# Patient Record
Sex: FEMALE | Race: WHITE
[De-identification: names, ages, dates, MRNs, and addresses within clinical notes are randomized per-mention and may not be internally consistent; named-entity substitution may affect disease eponyms.]

---

## 2019-10-26 ENCOUNTER — HOSPITAL ENCOUNTER (EMERGENCY)
Dept: HOSPITAL 75 - ER FS | Age: 39
Discharge: HOME | End: 2019-10-26
Payer: MEDICAID

## 2019-10-26 VITALS — HEIGHT: 66.97 IN | BODY MASS INDEX: 26.3 KG/M2 | WEIGHT: 167.55 LBS

## 2019-10-26 VITALS — SYSTOLIC BLOOD PRESSURE: 147 MMHG | DIASTOLIC BLOOD PRESSURE: 87 MMHG

## 2019-10-26 DIAGNOSIS — Z91.041: ICD-10-CM

## 2019-10-26 DIAGNOSIS — Z88.6: ICD-10-CM

## 2019-10-26 DIAGNOSIS — I10: ICD-10-CM

## 2019-10-26 DIAGNOSIS — Z88.1: ICD-10-CM

## 2019-10-26 DIAGNOSIS — Z90.710: ICD-10-CM

## 2019-10-26 DIAGNOSIS — Z88.0: ICD-10-CM

## 2019-10-26 DIAGNOSIS — F17.210: ICD-10-CM

## 2019-10-26 DIAGNOSIS — R10.9: Primary | ICD-10-CM

## 2019-10-26 LAB
ALBUMIN SERPL-MCNC: 4.3 GM/DL (ref 3.2–4.5)
ALP SERPL-CCNC: 75 U/L (ref 40–136)
ALT SERPL-CCNC: 11 U/L (ref 0–55)
AMORPH SED URNS QL MICRO: (no result) /LPF
APTT PPP: YELLOW S
BACTERIA #/AREA URNS HPF: NEGATIVE /HPF
BASOPHILS # BLD AUTO: 0.1 10^3/UL (ref 0–0.1)
BASOPHILS NFR BLD AUTO: 1 % (ref 0–10)
BILIRUB SERPL-MCNC: 0.4 MG/DL (ref 0.1–1)
BILIRUB UR QL STRIP: NEGATIVE
BUN/CREAT SERPL: 14
CALCIUM SERPL-MCNC: 9.4 MG/DL (ref 8.5–10.1)
CHLORIDE SERPL-SCNC: 103 MMOL/L (ref 98–107)
CO2 SERPL-SCNC: 27 MMOL/L (ref 21–32)
CREAT SERPL-MCNC: 0.97 MG/DL (ref 0.6–1.3)
EOSINOPHIL # BLD AUTO: 0.2 10^3/UL (ref 0–0.3)
EOSINOPHIL NFR BLD AUTO: 2 % (ref 0–10)
ERYTHROCYTE [DISTWIDTH] IN BLOOD BY AUTOMATED COUNT: 13.8 % (ref 10–14.5)
FIBRINOGEN PPP-MCNC: CLEAR MG/DL
GFR SERPLBLD BASED ON 1.73 SQ M-ARVRAT: > 60 ML/MIN
GLUCOSE SERPL-MCNC: 141 MG/DL (ref 70–105)
GLUCOSE UR STRIP-MCNC: NEGATIVE MG/DL
HCT VFR BLD CALC: 43 % (ref 35–52)
HGB BLD-MCNC: 14.5 G/DL (ref 11.5–16)
KETONES UR QL STRIP: NEGATIVE
LEUKOCYTE ESTERASE UR QL STRIP: NEGATIVE
LIPASE SERPL-CCNC: 25 U/L (ref 8–78)
LYMPHOCYTES # BLD AUTO: 2.2 X 10^3 (ref 1–4)
LYMPHOCYTES NFR BLD AUTO: 24 % (ref 12–44)
MANUAL DIFFERENTIAL PERFORMED BLD QL: NO
MCH RBC QN AUTO: 30 PG (ref 25–34)
MCHC RBC AUTO-ENTMCNC: 34 G/DL (ref 32–36)
MCV RBC AUTO: 88 FL (ref 80–99)
MONOCYTES # BLD AUTO: 0.6 X 10^3 (ref 0–1)
MONOCYTES NFR BLD AUTO: 6 % (ref 0–12)
NEUTROPHILS # BLD AUTO: 6 X 10^3 (ref 1.8–7.8)
NEUTROPHILS NFR BLD AUTO: 67 % (ref 42–75)
NITRITE UR QL STRIP: NEGATIVE
PH UR STRIP: 7 [PH] (ref 5–9)
PLATELET # BLD: 273 10^3/UL (ref 130–400)
PMV BLD AUTO: 9.5 FL (ref 7.4–10.4)
POTASSIUM SERPL-SCNC: 3.8 MMOL/L (ref 3.6–5)
PROT SERPL-MCNC: 6.7 GM/DL (ref 6.4–8.2)
PROT UR QL STRIP: NEGATIVE
RBC #/AREA URNS HPF: (no result) /HPF
SODIUM SERPL-SCNC: 143 MMOL/L (ref 135–145)
SP GR UR STRIP: 1.01 (ref 1.02–1.02)
SQUAMOUS #/AREA URNS HPF: (no result) /HPF
WBC # BLD AUTO: 9 10^3/UL (ref 4.3–11)
WBC #/AREA URNS HPF: (no result) /HPF

## 2019-10-26 PROCEDURE — 74176 CT ABD & PELVIS W/O CONTRAST: CPT

## 2019-10-26 PROCEDURE — 80053 COMPREHEN METABOLIC PANEL: CPT

## 2019-10-26 PROCEDURE — 81000 URINALYSIS NONAUTO W/SCOPE: CPT

## 2019-10-26 PROCEDURE — 36415 COLL VENOUS BLD VENIPUNCTURE: CPT

## 2019-10-26 PROCEDURE — 85025 COMPLETE CBC W/AUTO DIFF WBC: CPT

## 2019-10-26 PROCEDURE — 83690 ASSAY OF LIPASE: CPT

## 2019-10-26 NOTE — ED ABDOMINAL PAIN
General


Chief Complaint:  Abdominal/GI Problems


Stated Complaint:  STOMACH PAIN


Source of Information:  Patient


Exam Limitations:  No Limitations





History of Present Illness


Date Seen by Provider:  Oct 26, 2019


Time Seen by Provider:  15:27


Initial Comments


Patient presents with 5 months of intermittent mid abdominal pain.  Pain is 

located all over and of different quality in each quadrant.  States sometimes 

burning in her upper abdomen, burning in her left side and pressure in her right

lower abdomen as well as pressure in her lower mid abdomen.  States normally 

she's been constipated her whole life, but a few months ago she started to have 

bowel movements for times a day.  Denies any weight loss, has actually had we

ight gain.  States he used to see a gastroenterologist for her chronic 

constipation and was diagnosed with "colitis" at one point for which she was 

prescribed Bentyl and Flagyl.  History of chronic headaches and states she does 

not have a primary care doctor, but just gets refills from Dr. Andersen.  Rather 

evasive when continued to question on her medication and where she gets them, 

she repeatedly just states she does not have a primary care doctor. Also, states

she has a Hx of "gallbladder problems with a HIDA scan showing slow function".





Allergies and Home Medications


Allergies


Coded Allergies:  


     Iodinated Contrast- Oral and IV Dye (Verified  Allergy, Severe, angioedema,

8/5/19)


     Penicillins (Verified  Allergy, Unknown, 8/5/19)


     erythromycin base (Verified  Adverse Reaction, Intermediate, nausea and 

vomiting, 8/5/19)


     NSAIDS (Non-Steroidal Anti-Inflamma (Verified  Adverse Reaction, Unknown, 

GI bleed, 8/5/19)





Home Medications


Cholecalciferol (Vitamin D3) 50,000 Unit Capsule, 50,000 UNIT PO weekly, 

(Reported)


Hyoscyamine Sulfate 0.125 Mg Tab.subl, 0.125 MG SL Q6H


   Prescribed by: TERRY TUCKER on 10/26/19 1620


Methylprednisolone 4 Mg Tab.ds.pk, 4 MG PO UD


   PER DOSE PACK INSTRUCTIONS 


   Prescribed by: CANDACE COSTA on 8/5/19 2100


Metronidazole 500 Mg Tablet, 500 MG PO BID


   Prescribed by: TERRY TUCKER on 10/26/19 1634





Patient Home Medication List


Home Medication List Reviewed:  Yes





Review of Systems


Review of Systems


Constitutional:  No chills, No diaphoresis, No dizziness, No fever, No malaise, 

No weakness; weight gain; No weight loss


Respiratory:  Denies Cough, Denies Orthopnea, Denies Shortness of Air, Denies 

SOA With Exertion, Denies SOA at Rest, Denies Stridor, Denies Wheezing


Cardiovascular:  Denies Chest Pain, Denies Edema, Denies Irregular Heart Rate, 

Denies Palpitations, Denies Syncope


Gastrointestinal:  See HPI, Abdominal Pain; Denies Blood Streaked Stools; 

Constipated, Diarrhea; Denies Difficulty Swallowing; Nausea (chronic, states she

takes zofran daily for years); Denies Poor Appetite, Denies Poor Fluid Intake, 

Denies Rectal Bleeding, Denies Vomiting


Genitourinary:  Denies Burning, Denies Discharge, Denies Frequency; Flank Pain (

b/l)


Musculoskeletal:  No back pain, No joint pain





Past Medical-Social-Family Hx


Patient Social History


Alcohol Use:  Denies Use


Recreational Drug Use:  No


Smoking Status:  Current Everyday Smoker


Type Used:  Cigarettes


2nd Hand Smoke Exposure:  No


Recent Hopitalizations:  No


Physical Abuse:  No


Sexual Abuse:  No


Mistreated:  No


Fear:  No





Seasonal Allergies


Seasonal Allergies:  No





Past Medical History


Surgeries:  Yes (right achilles tendon repair)


Hysterectomy


Respiratory:  Yes (Pulmonary AVM)


Cardiac:  Yes


Hypertension


Neurological:  No


Genitourinary:  No


Gastrointestinal:  Yes (ANSHUL, bleeding stomach ulcers)


Musculoskeletal:  No


Endocrine:  Yes (Patient states she is undergoing a workup for Lupus)


HEENT:  No


Cancer:  No


Psychosocial:  No


Integumentary:  No





Physical Exam


Vital Signs





Vital Signs - First Documented








 10/26/19





 15:05


 


Temp 37.4


 


Pulse 114


 


Resp 20


 


B/P (MAP) 159/100 (119)


 


Pulse Ox 98


 


O2 Delivery Room Air





Capillary Refill :


Height/Weight/BMI


Height: 5'7.00"


Weight: 150lbs. oz. 68.843100mk;  BMI


Method:Stated


General Appearance:  WD/WN, no apparent distress


Respiratory:  chest non-tender, lungs clear, normal breath sounds, no 

respiratory distress, no accessory muscle use


Cardiovascular:  normal peripheral pulses, regular rate, rhythm, no edema, no 

gallop, no JVD, no murmur


Gastrointestinal:  normal bowel sounds, non tender, soft, no organomegaly, no 

pulsatile mass


Back:  normal inspection, no CVA tenderness


Neurologic/Psychiatric:  no motor/sensory deficits, alert, oriented x 3


Skin:  normal color, warm/dry





Progress/Results/Core Measures


Results/Orders


Lab Results





Laboratory Tests








Test


 10/26/19


15:05 10/26/19


15:35 Range/Units


 


 


Urine Color YELLOW    


 


Urine Clarity CLEAR    


 


Urine pH 7.0   5-9  


 


Urine Specific Gravity 1.015 L  1.016-1.022  


 


Urine Protein NEGATIVE   NEGATIVE  


 


Urine Glucose (UA) NEGATIVE   NEGATIVE  


 


Urine Ketones NEGATIVE   NEGATIVE  


 


Urine Nitrite NEGATIVE   NEGATIVE  


 


Urine Bilirubin NEGATIVE   NEGATIVE  


 


Urine Urobilinogen 0.2   NORMAL  MG/DL


 


Urine Leukocyte Esterase NEGATIVE   NEGATIVE  


 


Urine RBC (Auto) NEGATIVE   NEGATIVE  


 


Urine RBC RARE    /HPF


 


Urine WBC RARE    /HPF


 


Urine Squamous Epithelial


Cells 0-2 


 


  /HPF





 


Urine Crystals PRESENT H   /LPF


 


Urine Amorphous Sediment


 MOD JULIANNA


URATES H 


  /LPF





 


Urine Bacteria NEGATIVE    /HPF


 


Urine Casts NONE    /LPF


 


Urine Mucus SMALL H   /LPF


 


Urine Culture Indicated NO    


 


White Blood Count


 


 9.0 


 4.3-11.0


10^3/uL


 


Red Blood Count


 


 4.91 


 4.35-5.85


10^6/uL


 


Hemoglobin  14.5  11.5-16.0  G/DL


 


Hematocrit  43  35-52  %


 


Mean Corpuscular Volume  88  80-99  FL


 


Mean Corpuscular Hemoglobin  30  25-34  PG


 


Mean Corpuscular Hemoglobin


Concent 


 34 


 32-36  G/DL





 


Red Cell Distribution Width  13.8  10.0-14.5  %


 


Platelet Count


 


 273 


 130-400


10^3/uL


 


Mean Platelet Volume  9.5  7.4-10.4  FL


 


Neutrophils (%) (Auto)  67  42-75  %


 


Lymphocytes (%) (Auto)  24  12-44  %


 


Monocytes (%) (Auto)  6  0-12  %


 


Eosinophils (%) (Auto)  2  0-10  %


 


Basophils (%) (Auto)  1  0-10  %


 


Neutrophils # (Auto)  6.0  1.8-7.8  X 10^3


 


Lymphocytes # (Auto)  2.2  1.0-4.0  X 10^3


 


Monocytes # (Auto)  0.6  0.0-1.0  X 10^3


 


Eosinophils # (Auto)


 


 0.2 


 0.0-0.3


10^3/uL


 


Basophils # (Auto)


 


 0.1 


 0.0-0.1


10^3/uL


 


Sodium Level  143  135-145  MMOL/L


 


Potassium Level  3.8  3.6-5.0  MMOL/L


 


Chloride Level  103    MMOL/L


 


Carbon Dioxide Level  27  21-32  MMOL/L


 


Anion Gap  13  5-14  MMOL/L


 


Blood Urea Nitrogen  14  7-18  MG/DL


 


Creatinine


 


 0.97 


 0.60-1.30


MG/DL


 


Estimat Glomerular Filtration


Rate 


 > 60 


  





 


BUN/Creatinine Ratio  14   


 


Glucose Level  141 H   MG/DL


 


Calcium Level  9.4  8.5-10.1  MG/DL


 


Corrected Calcium  9.2  8.5-10.1  MG/DL


 


Total Bilirubin  0.4  0.1-1.0  MG/DL


 


Aspartate Amino Transf


(AST/SGOT) 


 19 


 5-34  U/L





 


Alanine Aminotransferase


(ALT/SGPT) 


 11 


 0-55  U/L





 


Alkaline Phosphatase  75    U/L


 


Total Protein  6.7  6.4-8.2  GM/DL


 


Albumin  4.3  3.2-4.5  GM/DL


 


Lipase  25  8-78  U/L








My Orders





Orders - ROVENSTTERRY POPE DO


Ed Iv/Invasive Line Start (10/26/19 15:33)


Cbc With Automated Diff (10/26/19 15:33)


Comprehensive Metabolic Panel (10/26/19 15:33)


Lipase (10/26/19 15:33)


Urinalysis (10/26/19 15:33)


Ns Iv 1000 Ml (Sodium Chloride 0.9%) (10/26/19 15:45)


Ct Abdomen/Pelvis Wo (10/26/19 15:33)





Vital Signs/I&O











 10/26/19





 15:05


 


Temp 37.4


 


Pulse 114


 


Resp 20


 


B/P (MAP) 159/100 (119)


 


Pulse Ox 98


 


O2 Delivery Room Air











Departure


Impression





   Primary Impression:  


   Abdominal pain


   Qualified Codes:  R10.84 - Generalized abdominal pain


Disposition:  01 HOME, SELF-CARE


Condition:  Stable





Departure-Patient Inst.


Decision time for Depature:  16:30


Referrals:  


NO,LOCAL PHYSICIAN (PCP/Family)


Primary Care Physician


Patient Instructions:  Irritable Bowel Syndrome (DC), Colitis


Scripts


Metronidazole (Flagyl) 500 Mg Tablet


500 MG PO BID for 14 Days, TAB


   Prov: CHARLENESTTERRY POPE DO         10/26/19 


Hyoscyamine Sulfate (Levsin-Sl) 0.125 Mg Tab.subl


0.125 MG SL Q6H for Cramps, #20 TAB


   Prov: CHARLENESTINETERRY L          10/26/19











ROBERTVENSTINESOLITARIOTERRY L          Oct 26, 2019 15:39

## 2019-10-26 NOTE — DIAGNOSTIC IMAGING REPORT
PROCEDURE: CT abdomen and pelvis without contrast.



TECHNIQUE: Multiple contiguous axial images were obtained through

the abdomen and pelvis without the use of intravenous contrast.

Auto Exposure Controls were utilized during the CT exam to meet

ALARA standards for radiation dose reduction. 



INDICATION:  Abdominal pain. Blood in the stools.



FINDINGS:  Liver, gallbladder and bile ducts are normal. The

spleen, pancreas and adrenals are normal. The left kidney and

ureter are normal. There is a 4 mm nonobstructing calculus in the

right kidney. The right ureter is normal. The bladder is normal.

There is no pelvic mass. The appendix is normal. There is mild

circumferential edema of the descending colon with minimal

pericolonic edema which is suggestive of a colitis. There is no

obstruction or perforation. No diverticuli are evident. The small

bowel is within normal limits.



IMPRESSION: There is some mild edema of the descending colon

which may be secondary to colitis. There is a nonobstructing

calculus in the right kidney. No obstruction, perforation or

abscess is evident.



Dictated by: 



  Dictated on workstation # [id number]

## 2020-08-17 ENCOUNTER — HOSPITAL ENCOUNTER (EMERGENCY)
Dept: HOSPITAL 75 - ER FS | Age: 40
Discharge: HOME | End: 2020-08-17
Payer: MEDICAID

## 2020-08-17 VITALS — BODY MASS INDEX: 27.02 KG/M2 | WEIGHT: 172.18 LBS | HEIGHT: 66.97 IN

## 2020-08-17 VITALS — SYSTOLIC BLOOD PRESSURE: 148 MMHG | DIASTOLIC BLOOD PRESSURE: 91 MMHG

## 2020-08-17 DIAGNOSIS — K04.7: ICD-10-CM

## 2020-08-17 DIAGNOSIS — Z91.041: ICD-10-CM

## 2020-08-17 DIAGNOSIS — Z88.1: ICD-10-CM

## 2020-08-17 DIAGNOSIS — Z79.52: ICD-10-CM

## 2020-08-17 DIAGNOSIS — Z88.6: ICD-10-CM

## 2020-08-17 DIAGNOSIS — Z88.0: ICD-10-CM

## 2020-08-17 DIAGNOSIS — G43.909: Primary | ICD-10-CM

## 2020-08-17 LAB
BASOPHILS # BLD AUTO: 0.1 10^3/UL (ref 0–0.1)
BASOPHILS NFR BLD AUTO: 1 % (ref 0–10)
BUN/CREAT SERPL: 14
CALCIUM SERPL-MCNC: 9.5 MG/DL (ref 8.5–10.1)
CHLORIDE SERPL-SCNC: 103 MMOL/L (ref 98–107)
CO2 SERPL-SCNC: 24 MMOL/L (ref 21–32)
CREAT SERPL-MCNC: 0.8 MG/DL (ref 0.6–1.3)
EOSINOPHIL # BLD AUTO: 0.3 10^3/UL (ref 0–0.3)
EOSINOPHIL NFR BLD AUTO: 2 % (ref 0–10)
ERYTHROCYTE [DISTWIDTH] IN BLOOD BY AUTOMATED COUNT: 13.4 % (ref 10–14.5)
GFR SERPLBLD BASED ON 1.73 SQ M-ARVRAT: > 60 ML/MIN
GLUCOSE SERPL-MCNC: 123 MG/DL (ref 70–105)
HCT VFR BLD CALC: 45 % (ref 35–52)
HGB BLD-MCNC: 15.3 G/DL (ref 11.5–16)
LYMPHOCYTES # BLD AUTO: 2 X 10^3 (ref 1–4)
LYMPHOCYTES NFR BLD AUTO: 18 % (ref 12–44)
MANUAL DIFFERENTIAL PERFORMED BLD QL: NO
MCH RBC QN AUTO: 29 PG (ref 25–34)
MCHC RBC AUTO-ENTMCNC: 34 G/DL (ref 32–36)
MCV RBC AUTO: 86 FL (ref 80–99)
MONOCYTES # BLD AUTO: 0.5 X 10^3 (ref 0–1)
MONOCYTES NFR BLD AUTO: 5 % (ref 0–12)
NEUTROPHILS # BLD AUTO: 8.3 X 10^3 (ref 1.8–7.8)
NEUTROPHILS NFR BLD AUTO: 74 % (ref 42–75)
PLATELET # BLD: 301 10^3/UL (ref 130–400)
PMV BLD AUTO: 10.1 FL (ref 7.4–10.4)
POTASSIUM SERPL-SCNC: 4.3 MMOL/L (ref 3.6–5)
SODIUM SERPL-SCNC: 142 MMOL/L (ref 135–145)
WBC # BLD AUTO: 11.3 10^3/UL (ref 4.3–11)

## 2020-08-17 PROCEDURE — 36415 COLL VENOUS BLD VENIPUNCTURE: CPT

## 2020-08-17 PROCEDURE — 80048 BASIC METABOLIC PNL TOTAL CA: CPT

## 2020-08-17 PROCEDURE — 70450 CT HEAD/BRAIN W/O DYE: CPT

## 2020-08-17 PROCEDURE — 86141 C-REACTIVE PROTEIN HS: CPT

## 2020-08-17 PROCEDURE — 85025 COMPLETE CBC W/AUTO DIFF WBC: CPT

## 2020-08-17 NOTE — ED NEUROLOGICAL PROBLEM
General


Stated Complaint:  FALL,EAR PAIN,HEADACHE





History of Present Illness


Date Seen by Provider:  Aug 17, 2020


Time Seen by Provider:  18:19


Initial Comments


40-year-old female presents with a migraine. She reports she's had a migraine 

for 6 days. She's had "ear pain" for longer than that thinks may she has an 

infected tooth. Patient also reports that 5 days ago she had felt was showing 

her migraines but she did not hit her head. The pain seems to be a little bit 

different than her normal migraines. That normally is on the left side this pain

is more on her right behind her ear. She feels a she has some fevers. She does 

not have any cough or other systemic complaints. She for she took 3 of her 

migraine medicines over the last 6 days.





Allergies and Home Medications


Allergies


Coded Allergies:  


     Iodinated Contrast- Oral and IV Dye (Verified  Allergy, Severe, angioedema,

19)


     Penicillins (Verified  Allergy, Unknown, 19)


     erythromycin base (Verified  Adverse Reaction, Intermediate, nausea and 

vomiting, 19)


     NSAIDS (Non-Steroidal Anti-Inflamma (Verified  Adverse Reaction, Unknown, 

GI bleed, 19)





Home Medications


Cholecalciferol (Vitamin D3) 50,000 Unit Capsule, 50,000 UNIT PO weekly, 

(Reported)


Hyoscyamine Sulfate 0.125 Mg Tab.subl, 0.125 MG SL Q6H


   Prescribed by: TERRY TUCKER on 10/26/19 1620


Methylprednisolone 4 Mg Tab.ds.pk, 4 MG PO UD


   PER DOSE PACK INSTRUCTIONS 


   Prescribed by: CANDACE COSTA on 19 2100


Metronidazole 500 Mg Tablet, 500 MG PO BID


   Prescribed by: TERRY TUCKER on 10/26/19 1634





Patient Home Medication List


Home Medication List Reviewed:  Yes





Review of Systems


Review of Systems


Constitutional:  see HPI; No malaise


Eyes:  Denies Blindness, Denies Blurred Vision


Ears, Nose, Mouth, Throat:  ear pain


Respiratory:  No cough, No short of breath


Cardiovascular:  No chest pain


Gastrointestinal:  No abdominal pain; nausea


Genitourinary:  no symptoms reported


Musculoskeletal:  no symptoms reported


Skin:  no symptoms reported


Psychiatric/Neurological:  Headache


Endocrine:  No Symptoms Reported


Hematologic/Lymphatic:  No Symptoms Reported





Past Medical-Social-Family Hx


Past Med/Social Hx:  Reviewed Nursing Past Med/Soc Hx


Patient Social History


Type Used:  Cigarettes


2nd Hand Smoke Exposure:  No


Recent Foreign Travel:  No


Contact w/Someone Who Travel:  No


Recent Hopitalizations:  No





Seasonal Allergies


Seasonal Allergies:  No





Past Medical History


Surgeries:  Yes (right achilles tendon repair)


Hysterectomy


Respiratory:  Yes (Pulmonary AVM)


Cardiac:  Yes


Hypertension


Neurological:  No


GYN History:  Hysterectomy


Genitourinary:  No


Gastrointestinal:  Yes (ANSHUL, bleeding stomach ulcers)


Musculoskeletal:  No


Endocrine:  Yes (Patient states she is undergoing a workup for Lupus)


HEENT:  No


Cancer:  No


Psychosocial:  No


Integumentary:  No





Physical Exam


Vital Signs





Vital Signs - First Documented








 20





 18:15


 


Temp 37.0


 


Pulse 104


 


Resp 16


 


B/P (MAP) 188/89 (122)


 


Pulse Ox 100


 


O2 Delivery Room Air





Capillary Refill :


Height, Weight, BMI


Height: 5'7.00"


Weight: 150lbs. oz. 68.691008ll; 26.00 BMI


Method:Stated


General Appearance:  no apparent distress, mild distress


HEENT:  PERRL/EOMI, normal ENT inspection, TMs normal, pharynx normal


Neck:  non-tender, full range of motion


Respiratory:  chest non-tender, lungs clear, normal breath sounds


Cardiovascular:  normal peripheral pulses, regular rate, rhythm


Gastrointestinal:  non tender, soft


Extremities:  normal range of motion


Neurologic/Psychiatric:  CNs II-XII nml as tested, no motor/sensory deficits, 

alert, normal mood/affect, oriented x 3


Crainal Nerves:  normal hearing, normal speech


Motor/Sensory:  no motor deficit, no sensory deficit


Skin:  normal color, warm/dry


Lymphatic:  no adenopathy





Progress/Results/Core Measures


Results/Orders


Lab Results





Laboratory Tests








Test


 20


18:30 Range/Units


 


 


White Blood Count


 11.3 H


 4.3-11.0


10^3/uL


 


Red Blood Count


 5.30 


 4.35-5.85


10^6/uL


 


Hemoglobin 15.3  11.5-16.0  G/DL


 


Hematocrit 45  35-52  %


 


Mean Corpuscular Volume 86  80-99  FL


 


Mean Corpuscular Hemoglobin 29  25-34  PG


 


Mean Corpuscular Hemoglobin


Concent 34 


 32-36  G/DL





 


Red Cell Distribution Width 13.4  10.0-14.5  %


 


Platelet Count


 301 


 130-400


10^3/uL


 


Mean Platelet Volume 10.1  7.4-10.4  FL


 


Neutrophils (%) (Auto) 74  42-75  %


 


Lymphocytes (%) (Auto) 18  12-44  %


 


Monocytes (%) (Auto) 5  0-12  %


 


Eosinophils (%) (Auto) 2  0-10  %


 


Basophils (%) (Auto) 1  0-10  %


 


Neutrophils # (Auto) 8.3 H 1.8-7.8  X 10^3


 


Lymphocytes # (Auto) 2.0  1.0-4.0  X 10^3


 


Monocytes # (Auto) 0.5  0.0-1.0  X 10^3


 


Eosinophils # (Auto)


 0.3 


 0.0-0.3


10^3/uL


 


Basophils # (Auto)


 0.1 


 0.0-0.1


10^3/uL








My Orders





Orders - ESTRADA HART DO


Ct Head Wo-R/O Stroke (20 18:29)


Basic Metabolic Panel (20 18:29)


Cbc With Automated Diff (20 18:29)


Crp Fs (20 18:29)


Ketorolac Injection (Toradol Injection) (20 18:29)


Ed Iv/Invasive Line Start (20 18:29)


Ns Iv 1000 Ml (Sodium Chloride 0.9%) (20 18:29)


Metoclopramide Injection (Reglan Injecti (20 18:29)


Ed Iv/Invasive Line Start (20 18:29)





Vital Signs/I&O











 20





 18:15


 


Temp 37.0


 


Pulse 104


 


Resp 16


 


B/P (MAP) 188/89 (122)


 


Pulse Ox 100


 


O2 Delivery Room Air











Progress


Progress Note :  


   Time:  19:08


Progress Note


Patient's symptoms improved with treatment. She had a negative CT of her head. 

She does have an elevated white count with concerns for possible dental 

infection. We'll go ahead and treat her with amoxicillin. She does have a 

penicillin allergy listed . reports  was a rash when she was a child and has 

taken amoxicillin since then. Patient is stable and will be discharged.





Diagnostic Imaging





   Diagonstic Imaging:  CT


   Plain Films/CT/US/NM/MRI:  head


Comments


                 [facility].


                                [place], [place]





NAME:   SARATH MUNOZ


MED REC#:   [id number]


ACCOUNT#:   [id number]


PT STATUS:   REG ER


:   1980


PHYSICIAN:   ESTRADA HART DO


ADMIT DATE:   20/ER FS


                                  ***Signed***


Date of Exam:20





CT HEAD WO-R/O STROKE








PROCEDURE: CT head wo r/o stroke.





TECHNIQUE: Multiple contiguous axial images were obtained through


the brain without the use of intravenous contrast. Auto Exposure


Controls were utilized during the CT exam to meet ALARA standards


for radiation dose reduction. 





INDICATION: Fell about a week ago has had headache and blurry


vision since.





COMPARISON STUDY: None





FINDINGS: Noncontrast CT scan the head demonstrates mass effect


midline shift hemorrhage or extra-axial fluid collections. The


gray-white matter differentiation is normal. Ventricles cortical


sulci and basilar cisterns appear normal. The osseous structures


are normal.





IMPRESSION: Normal CT scan the head.





Departure


Impression





   Primary Impression:  


   Migraine


   Qualified Codes:  G43.911 - Migraine, unspecified, intractable, with status 

   migrainosus


   Additional Impression:  


   Dental infection


Disposition:  01 HOME, SELF-CARE


Condition:  Stable





Departure-Patient Inst.


Referrals:  


NO,LOCAL PHYSICIAN (PCP/Family)


Primary Care Physician


Patient Instructions:  Migraines (DC), Dental Pain


Scripts


Amoxicillin (Amoxicillin) 500 Mg Capsule


500 MG PO TID, #21 CAP 0 Refills


   Prov: ESTRADA HART DO         20











ESTRADA HART DO               Aug 17, 2020 18:23

## 2020-08-17 NOTE — DIAGNOSTIC IMAGING REPORT
PROCEDURE: CT head wo r/o stroke.



TECHNIQUE: Multiple contiguous axial images were obtained through

the brain without the use of intravenous contrast. Auto Exposure

Controls were utilized during the CT exam to meet ALARA standards

for radiation dose reduction. 



INDICATION: Fell about a week ago has had headache and blurry

vision since.



COMPARISON STUDY: None



FINDINGS: Noncontrast CT scan the head demonstrates mass effect

midline shift hemorrhage or extra-axial fluid collections. The

gray-white matter differentiation is normal. Ventricles cortical

sulci and basilar cisterns appear normal. The osseous structures

are normal.



IMPRESSION: Normal CT scan the head.



Dictated by: 



  Dictated on workstation # [id number]

## 2020-08-17 NOTE — XMS REPORT
Continuity of Care Document

                             Created on: 2020



SARATH MUNOZ

External Reference #: [id number]

: 1980

Sex: Female



Demographics





                          Address                   [address] [place] [address]  [id number]

 

                          Home Phone                [phone] x

 

                          Preferred Language        Unknown

 

                          Marital Status            Unknown

 

                          Orthodox Affiliation     Unknown

 

                          Race                      Unknown

 

                          Ethnic Group              Unknown





Author





                          Author                    [facility]

 

                          Organization              [facility]

 

                          Address                   Unknown

 

                          Phone                     Unavailable



              



Allergies

      



             Active           Description           Code           Type         

  Severity   

                Reaction           Onset           Reported/Identified          

 

Relationship to Patient                 Clinical Status        

 

                Yes             Iodinated Contrast Media           [id number]   

        Drug 

Allergy           Severe           angioedema                           20

19  

                                                             

 

                    Yes                 Iodinated Contrast- Oral and IV Dye     

      [id number]          

             Drug Allergy           Severe           angioedema                 

       

2019                                                   

 

                Yes             erythromycin base           [id number]          

 Drug Allergy     

                Moderate           nausea and vomi                           2019        

                                                             

 

                    Yes                 NSAIDS (Non-Steroidal Anti-Inflamma     

      [id number]          

             Drug Allergy           Unknown           GI bleed                  

      

2019                                                   

 

             Yes           Penicillins           [id number]           Drug Aller

gy           

Unknown           N/A                        2019                         

  

     



                          



Medications

      



There is no data.                  



Problems

      



             Date Dx Coded           Attending           Type           Code    

       

Diagnosis                               Diagnosed By        

 

                08/10/2019           CANDACE COSTA DO, Ot          

    S30.1XXA       

                          CONTUSION OF ABDOMINAL WALL, INITIAL ENC              

      

 

                08/10/2019           CANDACE COSTA DO, Ot          

    S93.502A       

                          UNSPECIFIED SPRAIN OF LEFT GREAT TOE, IN              

      

 

                08/10/2019           CANDACE COSTA DO, Ot          

    S99.922A       

                          UNSPECIFIED INJURY OF LEFT FOOT, INITIAL              

      

 

                08/10/2019           CANDACE COSTA DO, Ot          

    W01.0XXA       

                          FALL SAME LEV FROM SLIP/TRIP W/O STRIKE               

      

 

                08/10/2019           CANDACE COSTA DO, Ot          

    Z88.0          

                          ALLERGY STATUS TO PENICILLIN                    

 

                08/10/2019           CANDACE COSTA DO           Ot          

    Z88.1          

                          ALLERGY STATUS TO OTHER ANTIBIOTIC AGENT              

      

 

                08/10/2019           CANDACE COSTA DO, Ot          

    Z88.6          

                          ALLERGY STATUS TO ANALGESIC AGENT STATUS              

      

 

                08/10/2019           CANDACE COSTA DO, Ot          

    Z91.041        

                          RADIOGRAPHIC DYE ALLERGY STATUS                    

 

                10/30/2019           TERRY TUCKER DO           Ot       

       F17.210     

                          NICOTINE DEPENDENCE, CIGARETTES, UNCOMPL              

      

 

                10/30/2019           CHARLENESTTERRY POPE DO           Ot       

       I10         

                          ESSENTIAL (PRIMARY) HYPERTENSION                    

 

                10/30/2019           CHARLENESTTERRY POPE DO           Ot       

       R10.9       

                          UNSPECIFIED ABDOMINAL PAIN                    

 

                10/30/2019           SOLITARIO TUCKER DOEN L           Ot       

       Z88.0       

                          ALLERGY STATUS TO PENICILLIN                    

 

                10/30/2019           ROVENSTINE DO, TERRY L           Ot       

       Z88.1       

                          ALLERGY STATUS TO OTHER ANTIBIOTIC AGENT              

      

 

                10/30/2019           ROVENSTINE DO, TERRY L           Ot       

       Z88.6       

                          ALLERGY STATUS TO ANALGESIC AGENT STATUS              

      

 

                10/30/2019           ROVENSTINE DO, TERRY L           Ot       

       Z90.710     

                          ACQUIRED ABSENCE OF BOTH CERVIX AND UTER              

      

 

                10/30/2019           ROVENSTINE DO, TERRY L           Ot       

       Z91.041     

                          RADIOGRAPHIC DYE ALLERGY STATUS                    

 

                2019           ROVENSTINE DO, TERRY L           Ot       

       F17.210     

                          NICOTINE DEPENDENCE, CIGARETTES, UNCOMPL              

      

 

                2019           ROVENSTINE DO, TERRY L           Ot       

       I10         

                          ESSENTIAL (PRIMARY) HYPERTENSION                    

 

                2019           ROVENSTINE DO, TERRY L           Ot       

       R10.9       

                          UNSPECIFIED ABDOMINAL PAIN                    

 

                2019           ROVENSTINE DO, TERRY L           Ot       

       Z88.0       

                          ALLERGY STATUS TO PENICILLIN                    

 

                2019           ROVENSTINE DO, TERRY L           Ot       

       Z88.1       

                          ALLERGY STATUS TO OTHER ANTIBIOTIC AGENT              

      

 

                2019           ROVENSTINE DO, TERRY L           Ot       

       Z88.6       

                          ALLERGY STATUS TO ANALGESIC AGENT STATUS              

      

 

                2019           ROVENSTINE DO, TERYR L           Ot       

       Z90.710     

                          ACQUIRED ABSENCE OF BOTH CERVIX AND UTER              

      

 

                2019           ROVENSTINE DO, TERRY L           Ot       

       Z91.041     

                          RADIOGRAPHIC DYE ALLERGY STATUS                    



                                                                



Procedures

      



There is no data.                  



Results

      



                    Test                Result              Range        

 

                                        Complete urinalysis with reflex to cultu

re - 19 18:10         

 

                    Urine color determination           YELLOW              NRG 

       

 

                    Urine clarity determination           CLEAR               NR

G        

 

                    Urine pH measurement by test strip           6.5            

     5-9        

 

                    Specific gravity of urine by test strip           <         

          1.016-1.022      

 

 

                          Urine protein assay by test strip, semi-quantitative  

         NEGATIVE         

                                        NEGATIVE        

 

                    Urine glucose detection by automated test strip           NE

GATIVE            

NEGATIVE        

 

                          Erythrocytes detection in urine sediment by light micr

oscopy           TRACE    

                                        NEGATIVE        

 

                    Urine ketones detection by automated test strip           NE

GATIVE            

NEGATIVE        

 

                    Urine nitrite detection by test strip           NEGATIVE    

        NEGATIVE    

   

 

                    Urine total bilirubin detection by test strip           NEGA

TIVE            

NEGATIVE        

 

                          Urine urobilinogen measurement by automated test strip

 (mass/volume)           

0.2 mg/dL                               NORMAL        

 

                    Urine leukocyte esterase detection by dipstick           NEG

ATIVE            

NEGATIVE        

 

                                        Automated urine sediment erythrocyte cou

nt by microscopy (number/high power 

field)                    RARE                      NRG        

 

                                        Automated urine sediment leukocyte count

 by microscopy (number/high power field)

                          NONE                      NRG        

 

                          Bacteria detection in urine sediment by light microsco

py           FEW          

                                        NRG        

 

                                        Squamous epithelial cells detection in u

rine sediment by light microscopy       

                          2-5                       NRG        

 

                          Crystals detection in urine sediment by light microsco

py           NONE         

                                        NRG        

 

                    Casts detection in urine sediment by light microscopy       

    NONE                

NRG        

 

                          Mucus detection in urine sediment by light microscopy 

          NEGATIVE        

                                        NRG        

 

                    Complete urinalysis with reflex to culture           NO     

             NRG        

 

                                        Urine beta human chorionic gonadotropin 

(hCG) measurement - 19 18:10      

  

 

                          Urine beta human chorionic gonadotropin (hCG) measurem

ent           NEGATIVE    

                                        NEGATIVE        

 

                                        Complete urinalysis with reflex to cultu

re - 10/26/19 15:05         

 

                    Urine color determination           YELLOW              NRG 

       

 

                    Urine clarity determination           CLEAR               NR

G        

 

                    Urine pH measurement by test strip           7.0            

     5-9        

 

                    Specific gravity of urine by test strip           1.015     

          1.016-1.022  

     

 

                          Urine protein assay by test strip, semi-quantitative  

         NEGATIVE         

                                        NEGATIVE        

 

                    Urine glucose detection by automated test strip           NE

GATIVE            

NEGATIVE        

 

                          Erythrocytes detection in urine sediment by light micr

oscopy           NEGATIVE 

                                        NEGATIVE        

 

                    Urine ketones detection by automated test strip           NE

GATIVE            

NEGATIVE        

 

                    Urine nitrite detection by test strip           NEGATIVE    

        NEGATIVE    

   

 

                    Urine total bilirubin detection by test strip           NEGA

TIVE            

NEGATIVE        

 

                          Urine urobilinogen measurement by automated test strip

 (mass/volume)           

0.2 mg/dL                               NORMAL        

 

                    Urine leukocyte esterase detection by dipstick           NEG

ATIVE            

NEGATIVE        

 

                                        Automated urine sediment erythrocyte cou

nt by microscopy (number/high power 

field)                    RARE                      NRG        

 

                                        Automated urine sediment leukocyte count

 by microscopy (number/high power field)

                          RARE                      NRG        

 

                          Bacteria detection in urine sediment by light microsco

py           NEGATIVE     

                                        NRG        

 

                                        Squamous epithelial cells detection in u

rine sediment by light microscopy       

                          0-2                       NRG        

 

                          Crystals detection in urine sediment by light microsco

py           PRESENT      

                                        NRG        

 

                    Casts detection in urine sediment by light microscopy       

    NONE                

NRG        

 

                          Mucus detection in urine sediment by light microscopy 

          SMALL           

                                        NRG        

 

                    Complete urinalysis with reflex to culture           NO     

             NRG        

 

                          Amorphous sediment detection in urine sediment by ligh

t microscopy           MOD

JULIANNA URATES                             NRG        

 

                                        Complete blood count (CBC) with automate

d white blood cell (WBC) differential - 

10/26/19 15:35         

 

                          Blood leukocytes automated count (number/volume)      

     9.0 10*3/uL          

                                        4.3-11.0        

 

                          Blood erythrocytes automated count (number/volume)    

       4.91 10*6/uL       

                                        4.35-5.85        

 

                    Venous blood hemoglobin measurement (mass/volume)           

14.5 g/dL           

11.5-16.0        

 

                    Blood hematocrit (volume fraction)           43 %           

     35-52        

 

                    Automated erythrocyte mean corpuscular volume           88 [

foz_us]           

80-99        

 

                                        Automated erythrocyte mean corpuscular h

emoglobin (mass per erythrocyte)        

                          30 pg                     25-34        

 

                                        Automated erythrocyte mean corpuscular h

emoglobin concentration measurement 

(mass/volume)             34 g/dL                   32-36        

 

                    Automated erythrocyte distribution width ratio           13.

8 %              10.0-

14.5        

 

                    Automated blood platelet count (count/volume)           273 

10*3/uL           

130-400        

 

                          Automated blood platelet mean volume measurement      

     9.5 [foz_us]         

                                        7.4-10.4        

 

                    Automated blood neutrophils/100 leukocytes           67 %   

             42-75       

 

 

                    Automated blood lymphocytes/100 leukocytes           24 %   

             12-44       

 

 

                    Blood monocytes/100 leukocytes           6 %                

 0-12        

 

                    Automated blood eosinophils/100 leukocytes           2 %    

             0-10        

 

                    Automated blood basophils/100 leukocytes           1 %      

           0-10        

 

                    Blood neutrophils automated count (number/volume)           

6.0 10*3            

1.8-7.8        

 

                    Blood lymphocytes automated count (number/volume)           

2.2 10*3            

1.0-4.0        

 

                    Blood monocytes automated count (number/volume)           0.

6 10*3            

0.0-1.0        

 

                    Automated eosinophil count           0.2 10*3/uL           0

.0-0.3        

 

                    Automated blood basophil count (count/volume)           0.1 

10*3/uL           

0.0-0.1        

 

                                        Comprehensive metabolic panel - 10/26/19

 15:35         

 

                          Serum or plasma sodium measurement (moles/volume)     

      143 mmol/L          

                                        135-145        

 

                          Serum or plasma potassium measurement (moles/volume)  

         3.8 mmol/L       

                                        3.6-5.0        

 

                          Serum or plasma chloride measurement (moles/volume)   

        103 mmol/L        

                                                

 

                    Carbon dioxide           27 mmol/L           21-32        

 

                          Serum or plasma anion gap determination (moles/volume)

           13 mmol/L      

                                        5-14        

 

                          Serum or plasma urea nitrogen measurement (mass/volume

)           14 mg/dL      

                                        7-18        

 

                          Serum or plasma creatinine measurement (mass/volume)  

         0.97 mg/dL       

                                        0.60-1.30        

 

                    Serum or plasma urea nitrogen/creatinine mass ratio         

  14                  NRG 

       

 

                                        Serum or plasma creatinine measurement w

ith calculation of estimated glomerular 

filtration rate           >                         NRG        

 

                    Serum or plasma glucose measurement (mass/volume)           

141 mg/dL           

        

 

                    Serum or plasma calcium measurement (mass/volume)           

9.4 mg/dL           

8.5-10.1        

 

                          Serum or plasma total bilirubin measurement (mass/volu

me)           0.4 mg/dL   

                                        0.1-1.0        

 

                                        Serum or plasma alkaline phosphatase parish

surement (enzymatic activity/volume)    

                          75 U/L                            

 

                                        Serum or plasma aspartate aminotransfera

se measurement (enzymatic 

activity/volume)           19 U/L                    5-34        

 

                                        Serum or plasma alanine aminotransferase

 measurement (enzymatic activity/volume)

                          11 U/L                    0-55        

 

                    Serum or plasma protein measurement (mass/volume)           

6.7 g/dL            

6.4-8.2        

 

                    Serum or plasma albumin measurement (mass/volume)           

4.3 g/dL            

3.2-4.5        

 

                    CALCIUM CORRECTED           9.2 mg/dL           8.5-10.1    

    

 

                                        Lipase - 10/26/19 15:35         

 

                    Lipase              25 U/L              8-78        

 

                                        Complete blood count (CBC) with automate

d white blood cell (WBC) differential - 

20 18:30         

 

                          Blood leukocytes automated count (number/volume)      

     11.3 10*3/uL         

                                        4.3-11.0        

 

                          Blood erythrocytes automated count (number/volume)    

       5.30 10*6/uL       

                                        4.35-5.85        

 

                    Venous blood hemoglobin measurement (mass/volume)           

15.3 g/dL           

11.5-16.0        

 

                    Blood hematocrit (volume fraction)           45 %           

     35-52        

 

                    Automated erythrocyte mean corpuscular volume           86 [

foz_us]           

80-99        

 

                                        Automated erythrocyte mean corpuscular h

emoglobin (mass per erythrocyte)        

                          29 pg                     25-34        

 

                                        Automated erythrocyte mean corpuscular h

emoglobin concentration measurement 

(mass/volume)             34 g/dL                   32-36        

 

                    Automated erythrocyte distribution width ratio           13.

4 %              10.0-

14.5        

 

                    Automated blood platelet count (count/volume)           301 

10*3/uL           

130-400        

 

                          Automated blood platelet mean volume measurement      

     10.1 [foz_us]        

                                        7.4-10.4        

 

                    Automated blood neutrophils/100 leukocytes           74 %   

             42-75       

 

 

                    Automated blood lymphocytes/100 leukocytes           18 %   

             12-44       

 

 

                    Blood monocytes/100 leukocytes           5 %                

 0-12        

 

                    Automated blood eosinophils/100 leukocytes           2 %    

             0-10        

 

                    Automated blood basophils/100 leukocytes           1 %      

           0-10        

 

                    Blood neutrophils automated count (number/volume)           

8.3 10*3            

1.8-7.8        

 

                    Blood lymphocytes automated count (number/volume)           

2.0 10*3            

1.0-4.0        

 

                    Blood monocytes automated count (number/volume)           0.

5 10*3            

0.0-1.0        

 

                    Automated eosinophil count           0.3 10*3/uL           0

.0-0.3        

 

                    Automated blood basophil count (count/volume)           0.1 

10*3/uL           

0.0-0.1        

 

                                        Whole blood basic metabolic panel -  18:30         

 

                          Serum or plasma sodium measurement (moles/volume)     

      142 mmol/L          

                                        135-145        

 

                          Serum or plasma potassium measurement (moles/volume)  

         4.3 mmol/L       

                                        3.6-5.0        

 

                          Serum or plasma chloride measurement (moles/volume)   

        103 mmol/L        

                                                

 

                    Carbon dioxide           24 mmol/L           21-32        

 

                          Serum or plasma anion gap determination (moles/volume)

           15 mmol/L      

                                        5-14        

 

                          Serum or plasma urea nitrogen measurement (mass/volume

)           11 mg/dL      

                                        7-18        

 

                          Serum or plasma creatinine measurement (mass/volume)  

         0.80 mg/dL       

                                        0.60-1.30        

 

                    Serum or plasma urea nitrogen/creatinine mass ratio         

  14                  NRG 

       

 

                                        Serum or plasma creatinine measurement w

ith calculation of estimated glomerular 

filtration rate           >                         NRG        

 

                    Serum or plasma glucose measurement (mass/volume)           

123 mg/dL           

        

 

                    Serum or plasma calcium measurement (mass/volume)           

9.5 mg/dL           

8.5-10.1        

 

                                        CRP FS - 20 18:30         

 

                    CRP FS              0.78 mg/dL           <0.50        



                                



Encounters

      



                ACCT No.           Visit Date/Time           Discharge          

 Status         

             Pt. Type           Provider           Facility           Loc./Unit 

          

Complaint        

 

                    [id number]           2020 18:16:00           

020 19:15:00        

                DIS             Emergency           HART ESTRADA [place]           

[facility]           ER FS                     FALL,EAR PAIN,HEADACHE 

       

 

                    [id number]           10/26/2019 15:04:00           10/26/2

019 16:58:00        

                DIS             Outpatient           ROVENSTTERRY POPE DO    

       [facility] 

[facility]           ER FS                     STOMACH PAIN   

     

 

                    [id number]           2019 17:52:00           

019 21:15:00        

                DIS             Outpatient           CANDACE COSTA DO       

    [facility]           ER FS                     LOW ABD/ GROIN PAIN, H

EADACHE

## 2020-08-19 ENCOUNTER — HOSPITAL ENCOUNTER (EMERGENCY)
Dept: HOSPITAL 75 - ER FS | Age: 40
Discharge: HOME | End: 2020-08-19
Payer: MEDICAID

## 2020-08-19 DIAGNOSIS — K08.89: Primary | ICD-10-CM

## 2020-08-19 DIAGNOSIS — Z88.1: ICD-10-CM

## 2020-08-19 DIAGNOSIS — Z88.0: ICD-10-CM

## 2020-08-19 DIAGNOSIS — Z88.6: ICD-10-CM

## 2020-08-19 DIAGNOSIS — Z91.041: ICD-10-CM

## 2020-08-19 DIAGNOSIS — Z79.52: ICD-10-CM

## 2020-10-07 NOTE — ED EENT
History of Present Illness


General


Stated Complaint:  DENTAL PAIN/HEADACHE





History of Present Illness


Date Seen by Provider:  Aug 19, 2020


Time Seen by Provider:  08:30


Initial Comments


40-year-old female presents with dental pain for 3 days. Patient has a wisdom t

ooth needed to be pulled. Patient is currently on an antibiotic and take Tylenol

ibuprofen. Patient reports the pain has gotten worse. She denies any fevers 

chills nausea vomiting shortness of breath.





Allergies and Home Medications


Allergies


Coded Allergies:  


     Iodinated Contrast Media (Verified  Allergy, Severe, angioedema, 8/5/19)


     Penicillins (Verified  Allergy, Unknown, 8/5/19)


     erythromycin base (Verified  Adverse Reaction, Intermediate, nausea and 

vomiting, 8/5/19)


     NSAIDS (Non-Steroidal Anti-Inflamma (Verified  Adverse Reaction, Unknown, 

GI bleed, 8/5/19)





Home Medications


Amoxicillin 500 Mg Capsule, 500 MG PO TID


   Prescribed by: ESTRADA HART on 8/17/20 1911


Cholecalciferol (Vitamin D3) 50,000 Unit Capsule, 50,000 UNIT PO weekly, 

(Reported)


Hyoscyamine Sulfate 0.125 Mg Tab.subl, 0.125 MG SL Q6H


   Prescribed by: TERRY TUCKER on 10/26/19 1620


Methylprednisolone 4 Mg Tab.ds.pk, 4 MG PO UD


   PER DOSE PACK INSTRUCTIONS 


   Prescribed by: CANDACE COSTA on 8/5/19 2100


Metronidazole 500 Mg Tablet, 500 MG PO BID


   Prescribed by: TERRY TUCKER on 10/26/19 1634





Patient Home Medication List


Home Medication List Reviewed:  Yes





Review of Systems


Review of Systems


Constitutional:  no symptoms reported


Eyes:  No Symptoms Reported


Ears:  No Symptoms Reported


Nose:  no symptoms reported


Mouth:  see HPI


Respiratory:  no symptoms reported


Cardiovascular:  no symptoms reported





Past Medical-Social-Family Hx


Past Med/Social Hx:  Reviewed Nursing Past Med/Soc Hx


Patient Social History


Type Used:  Cigarettes


2nd Hand Smoke Exposure:  No


Recent Hopitalizations:  No





Seasonal Allergies


Seasonal Allergies:  No





Past Medical History


Surgeries:  Yes (right achilles tendon repair)


Hysterectomy


Respiratory:  Yes (Pulmonary AVM)


Cardiac:  Yes


Hypertension


Neurological:  No


GYN History:  Hysterectomy


Genitourinary:  No


Gastrointestinal:  Yes (ANSHUL, bleeding stomach ulcers)


Musculoskeletal:  No


Endocrine:  Yes (Patient states she is undergoing a workup for Lupus)


HEENT:  No


Cancer:  No


Psychosocial:  No


Integumentary:  No





Physical Exam


Height, Weight, BMI


Height: 5'7.00"


Weight: 150lbs. oz. 68.384684oi; 26.00 BMI


Method:Stated


General Appearance:  WD/WN, no apparent distress


Mouth/Throat:  other (left lower wisdom tooth with abnormal eruption with 

swelling of the gums)


Cardiovascular:  normal peripheral pulses, regular rate, rhythm


Respiratory:  lungs clear, normal breath sounds


Gastrointestinal:  non tender, soft


Neurologic/Psychiatric:  alert, normal mood/affect, oriented x 3


Skin:  normal color, warm/dry





Progress/Results/Core Measures


Progress


Progress Note :  


Progress Note


Patient was given a prescription for a couple tramadol. She is instructed to 

follow up with a dentist as soon as possible have her wisdom tooth removed. 

Patient was discharged in stable condition





Departure


Impression





   Primary Impression:  


   Pain, dental


Disposition:  01 HOME, SELF-CARE


Condition:  Stable





Departure-Patient Inst.


Referrals:  


NO,LOCAL PHYSICIAN (PCP)


Primary Care Physician





Add. Discharge Instructions:  


Please follow-up with a dentist as soon as possible











ESTRADA HART DO                Oct 7, 2020 15:51